# Patient Record
Sex: MALE
[De-identification: names, ages, dates, MRNs, and addresses within clinical notes are randomized per-mention and may not be internally consistent; named-entity substitution may affect disease eponyms.]

---

## 2023-08-02 DIAGNOSIS — Z00.00 HEALTH CARE MAINTENANCE: Primary | ICD-10-CM

## 2023-08-02 RX ORDER — SILDENAFIL 50 MG/1
100 TABLET, FILM COATED ORAL AS NEEDED
COMMUNITY
Start: 2020-06-09 | End: 2023-08-02 | Stop reason: SDUPTHER

## 2023-08-02 RX ORDER — MINOXIDIL 2.5 MG/1
2.5 TABLET ORAL DAILY
Qty: 90 TABLET | Refills: 3 | Status: CANCELLED | OUTPATIENT
Start: 2023-08-02

## 2023-08-02 RX ORDER — MINOXIDIL 2.5 MG/1
2.5 TABLET ORAL DAILY
COMMUNITY
Start: 2023-07-10 | End: 2023-08-03 | Stop reason: DRUGHIGH

## 2023-08-03 RX ORDER — SILDENAFIL 50 MG/1
100 TABLET, FILM COATED ORAL AS NEEDED
Qty: 30 TABLET | Refills: 0 | Status: SHIPPED | OUTPATIENT
Start: 2023-08-03 | End: 2023-08-07 | Stop reason: SDUPTHER

## 2023-08-03 RX ORDER — MINOXIDIL 2.5 MG/1
2.5 TABLET ORAL DAILY
Qty: 30 TABLET | Refills: 0 | Status: SHIPPED | OUTPATIENT
Start: 2023-08-03

## 2023-08-07 DIAGNOSIS — Z00.00 HEALTH CARE MAINTENANCE: ICD-10-CM

## 2023-08-10 RX ORDER — SILDENAFIL 50 MG/1
100 TABLET, FILM COATED ORAL AS NEEDED
Qty: 30 TABLET | Refills: 0 | Status: SHIPPED | OUTPATIENT
Start: 2023-08-10 | End: 2024-02-26 | Stop reason: SDUPTHER

## 2023-09-27 ENCOUNTER — NURSE TRIAGE (OUTPATIENT)
Dept: OTHER | Facility: CLINIC | Age: 58
End: 2023-09-27

## 2023-09-27 ENCOUNTER — OFFICE VISIT (OUTPATIENT)
Dept: PRIMARY CARE | Facility: CLINIC | Age: 58
End: 2023-09-27
Payer: COMMERCIAL

## 2023-09-27 ENCOUNTER — APPOINTMENT (OUTPATIENT)
Dept: PRIMARY CARE | Facility: CLINIC | Age: 58
End: 2023-09-27
Payer: COMMERCIAL

## 2023-09-27 VITALS — RESPIRATION RATE: 12 BRPM

## 2023-09-27 DIAGNOSIS — F41.8 SITUATIONAL ANXIETY: ICD-10-CM

## 2023-09-27 DIAGNOSIS — Z70.8 ENCOUNTER FOR SEXUAL HEALTH EDUCATION: ICD-10-CM

## 2023-09-27 DIAGNOSIS — Z00.00 HEALTH CARE MAINTENANCE: Primary | ICD-10-CM

## 2023-09-27 PROCEDURE — 99213 OFFICE O/P EST LOW 20 MIN: CPT | Performed by: INTERNAL MEDICINE

## 2023-09-27 NOTE — PROGRESS NOTES
Subjective   Patient ID: Raciel Sanchez is a 57 y.o. male who presents for No chief complaint on file..    HPI sick visit same day after hours no staff no chest pain no shortness of breath no skin lesions no irritations no urethritis no diarrhea no exposures but would feel better having STI testing    Review of Systems    Objective   There were no vitals taken for this visit.    Physical Exam vital signs noted alert and oriented x3 breathing unlabored not otherwise examined    Assessment/Plan    impression General health anxiety and STI counseling  Plan he would like to have HSV Ig G testing HIV testing RPR testing discussed with risk-benefit side effects of testing there are no symptoms no visible lesions okay to check for GC and chlamydia and follow-up on any results discussed with sexual health and protection follow-up for regular visit

## 2023-09-28 ENCOUNTER — TELEMEDICINE (OUTPATIENT)
Dept: PRIMARY CARE | Facility: CLINIC | Age: 58
End: 2023-09-28
Payer: COMMERCIAL

## 2023-09-28 DIAGNOSIS — U07.1 COVID-19 VIRUS INFECTION: Primary | ICD-10-CM

## 2023-09-28 DIAGNOSIS — R53.83 OTHER FATIGUE: ICD-10-CM

## 2023-09-28 DIAGNOSIS — J01.90 ACUTE SINUSITIS, RECURRENCE NOT SPECIFIED, UNSPECIFIED LOCATION: ICD-10-CM

## 2023-09-28 PROCEDURE — 99442 PR PHYS/QHP TELEPHONE EVALUATION 11-20 MIN: CPT | Performed by: INTERNAL MEDICINE

## 2023-09-28 NOTE — TELEPHONE ENCOUNTER
Location of patient: [place]    Subjective: Caller states \"I have a positive COVID test a couple hours ago and hoping to get Paxlovid \"     Current Symptoms:   Sore throat   Chills   Fever  Nonproductive cough     Onset: Monday Night, 2 days ago     Pain Severity: denies     Temperature: 97.5 oral     What has been tried: ibuprofren, tylenol, [facility] cough medicine, lucretia v     Recommended disposition: See PCP within 24 Hours    Care advice provided, patient verbalizes understanding; denies any other questions or concerns; instructed to call back for any new or worsening symptoms. Patient/caller agrees to follow-up with PCP     This triage is a result of a call to [address] [place] [address]. Please do not respond to the triage nurse through this encounter. Any subsequent communication should be directly with the patient.     Reason for Disposition   [1] HIGH RISK patient AND [2] influenza is widespread in the community AND [3] ONE OR MORE respiratory symptoms: cough, sore throat, runny or stuffy nose    Protocols used: Coronavirus (COVID-19) Diagnosed or Sus[facility]

## 2023-09-28 NOTE — PROGRESS NOTES
Subjective   Patient ID: Raciel Sanchez is a 57 y.o. male who presents for No chief complaint on file..    HPI patient initiated telehealth visit this visit was completed via telephone secondary to the restrictions of the COVID-19 pandemic all medical issues were addressed and discussed with patient patient was not otherwise examined if it was felt that the patient needed to be seen in the office they would have been referred to do so patient verbally consented to visit  Sick visit he had flown in from [place] 5 days ago yesterday he began having symptoms sinus sore throat fatigue low-grade fever had COVID testing late last night and was positive had tried some over-the-counter medications without success    Review of Systems    Objective   There were no vitals taken for this visit.    Physical Exam alert and oriented x3 breathing unlabored not otherwise examined    Assessment/Plan impression COVID-positive sinus fatigue  Plan he will hold on having any additional blood work at this time 5 days of isolation 5 days of masking thereafter okay for Paxlovid therapy 3 tablets in the morning 3 tablets in the evening x5 days risk-benefit side effects reviewed with him and wishes to proceed see EMR good diet regular exercise increase water consumption Mucinex if needed to a lozenge if needed Zyrtec if needed proper rest recheck if no better and for regular visit

## 2023-10-02 ENCOUNTER — LAB (OUTPATIENT)
Dept: LAB | Facility: LAB | Age: 58
End: 2023-10-02
Payer: COMMERCIAL

## 2023-10-02 DIAGNOSIS — Z00.00 HEALTH CARE MAINTENANCE: ICD-10-CM

## 2023-10-02 LAB — HIV 1+2 AB+HIV1 P24 AG SERPL QL IA: NONREACTIVE

## 2023-10-02 PROCEDURE — 86694 HERPES SIMPLEX NES ANTBDY: CPT

## 2023-10-02 PROCEDURE — 36415 COLL VENOUS BLD VENIPUNCTURE: CPT

## 2023-10-03 PROBLEM — G89.29 CHRONIC BACK PAIN: Status: ACTIVE | Noted: 2023-10-03

## 2023-10-03 PROBLEM — G44.209 TENSION HEADACHE: Status: ACTIVE | Noted: 2023-10-03

## 2023-10-03 PROBLEM — M54.12 RIGHT CERVICAL RADICULOPATHY: Status: ACTIVE | Noted: 2023-10-03

## 2023-10-03 PROBLEM — M54.12 LEFT CERVICAL RADICULOPATHY: Status: ACTIVE | Noted: 2023-10-03

## 2023-10-03 PROBLEM — F10.10 ALCOHOL ABUSE: Status: ACTIVE | Noted: 2023-10-03

## 2023-10-03 PROBLEM — M17.11 PRIMARY LOCALIZED OSTEOARTHROSIS OF RIGHT LOWER LEG: Status: ACTIVE | Noted: 2023-10-03

## 2023-10-03 PROBLEM — M54.2 CERVICALGIA: Status: ACTIVE | Noted: 2023-10-03

## 2023-10-03 PROBLEM — M54.12 CERVICAL RADICULOPATHY: Status: ACTIVE | Noted: 2023-10-03

## 2023-10-03 PROBLEM — N52.9 MALE ERECTILE DISORDER: Status: ACTIVE | Noted: 2023-10-03

## 2023-10-03 PROBLEM — M54.9 CHRONIC BACK PAIN: Status: ACTIVE | Noted: 2023-10-03

## 2023-10-03 PROBLEM — M11.269 CHONDROCALCINOSIS DUE TO DICALCIUM PHOSPHATE CRYSTALS, OF THE KNEE: Status: ACTIVE | Noted: 2023-10-03

## 2023-10-03 PROBLEM — M87.00 AVN (AVASCULAR NECROSIS OF BONE) (MULTI): Status: ACTIVE | Noted: 2023-10-03

## 2023-10-03 PROBLEM — F32.A DEPRESSION: Status: ACTIVE | Noted: 2023-10-03

## 2023-10-03 PROBLEM — F10.11 HISTORY OF ALCOHOL ABUSE: Status: ACTIVE | Noted: 2023-10-03

## 2023-10-03 PROBLEM — M17.12 PRIMARY LOCALIZED OSTEOARTHROSIS OF LEFT LOWER LEG: Status: ACTIVE | Noted: 2023-10-03

## 2023-10-03 PROBLEM — R73.02 GLUCOSE INTOLERANCE (IMPAIRED GLUCOSE TOLERANCE): Status: ACTIVE | Noted: 2023-10-03

## 2023-10-03 PROBLEM — F41.9 ANXIETY: Status: ACTIVE | Noted: 2023-10-03

## 2023-10-03 PROBLEM — B00.9 HSV INFECTION: Status: ACTIVE | Noted: 2023-10-03

## 2023-10-03 PROBLEM — H69.90 EUSTACHIAN TUBE DYSFUNCTION: Status: ACTIVE | Noted: 2023-10-03

## 2023-10-03 PROBLEM — G95.9 CERVICAL MYELOPATHY (MULTI): Status: ACTIVE | Noted: 2023-10-03

## 2023-10-03 PROBLEM — M79.10 MYALGIA: Status: ACTIVE | Noted: 2023-10-03

## 2023-10-03 LAB
C TRACH RRNA SPEC QL NAA+PROBE: NEGATIVE
N GONORRHOEA DNA SPEC QL PROBE+SIG AMP: NEGATIVE
RPR SER QL: NONREACTIVE

## 2023-10-03 RX ORDER — DULOXETIN HYDROCHLORIDE 60 MG/1
60 CAPSULE, DELAYED RELEASE ORAL DAILY
COMMUNITY

## 2023-10-03 RX ORDER — ASPIRIN 81 MG
1 TABLET, DELAYED RELEASE (ENTERIC COATED) ORAL DAILY
COMMUNITY
Start: 2022-10-03

## 2023-10-03 RX ORDER — SODIUM, POTASSIUM,MAG SULFATES 17.5-3.13G
SOLUTION, RECONSTITUTED, ORAL ORAL
COMMUNITY
Start: 2020-10-19

## 2023-10-03 RX ORDER — TESTOSTERONE 30 MG/1.5ML
SOLUTION TOPICAL
COMMUNITY

## 2023-10-03 RX ORDER — OXYCODONE HYDROCHLORIDE 5 MG/1
5 TABLET ORAL EVERY 6 HOURS PRN
COMMUNITY
Start: 2022-09-15

## 2023-10-03 RX ORDER — VENLAFAXINE HYDROCHLORIDE 150 MG/1
150 CAPSULE, EXTENDED RELEASE ORAL DAILY
COMMUNITY

## 2023-10-03 RX ORDER — PREDNISONE 20 MG/1
2 TABLET ORAL
COMMUNITY
Start: 2019-07-22

## 2023-10-03 RX ORDER — MELOXICAM 15 MG/1
1 TABLET ORAL
COMMUNITY
Start: 2021-04-12

## 2023-10-03 RX ORDER — DIAZEPAM 5 MG/1
TABLET ORAL
COMMUNITY
Start: 2023-04-17

## 2023-10-03 RX ORDER — TACROLIMUS 0.3 MG/G
OINTMENT TOPICAL 2 TIMES DAILY
COMMUNITY
Start: 2015-07-08

## 2023-10-03 RX ORDER — TESTOSTERONE CYPIONATE 200 MG/ML
0.8 INJECTION, SOLUTION INTRAMUSCULAR
COMMUNITY
Start: 2023-08-21

## 2023-10-03 RX ORDER — CLOBETASOL PROPIONATE 0.5 MG/G
1 CREAM TOPICAL
COMMUNITY
Start: 2015-07-08

## 2023-10-03 RX ORDER — OXYCODONE AND ACETAMINOPHEN 5; 325 MG/1; MG/1
TABLET ORAL
COMMUNITY
Start: 2023-04-17

## 2023-10-03 RX ORDER — CLOMIPHENE CITRATE 50 MG/1
50 TABLET ORAL 2 TIMES WEEKLY
COMMUNITY

## 2023-10-03 RX ORDER — HYDROXYUREA 500 MG/1
500-1000 CAPSULE ORAL
COMMUNITY
Start: 2019-07-22

## 2023-10-03 RX ORDER — ADALIMUMAB 40MG/0.4ML
KIT SUBCUTANEOUS
COMMUNITY
Start: 2023-08-07

## 2023-10-03 RX ORDER — DUTASTERIDE 0.5 MG/1
0.5 CAPSULE, LIQUID FILLED ORAL DAILY
COMMUNITY

## 2023-10-03 RX ORDER — TOPIRAMATE 100 MG/1
100 TABLET, FILM COATED ORAL 2 TIMES DAILY
COMMUNITY

## 2023-10-03 RX ORDER — METHYLPREDNISOLONE 4 MG/1
TABLET ORAL
COMMUNITY
Start: 2023-06-09

## 2023-10-04 ENCOUNTER — ANCILLARY PROCEDURE (OUTPATIENT)
Dept: ENDOCRINOLOGY | Facility: CLINIC | Age: 58
End: 2023-10-04
Payer: COMMERCIAL

## 2023-10-04 DIAGNOSIS — Z30.09 VASECTOMY EVALUATION: ICD-10-CM

## 2023-10-04 LAB
ABSTINENCE (DAYS): 2 DAYS (ref 2–7)
AGGLUTINATION (SEMEN): NO
AMOUNT MISSED:: ABNORMAL
ANALYZED TIME:: ABNORMAL
ANDROLOGY LAB ID#: ABNORMAL
CLUMPS (SEMEN): NO
COLLECTED COMPLETELY: YES
COLLECTION LOCATION:: ABNORMAL
COLLECTION METHOD:: ABNORMAL
CONCENTRATION CN (POST-WASH): 0 MILL/ML
CONCENTRATION(SEMEN): 0 MILL/ML (ref 15–?)
DEBRIS (SEMEN): YES
DEGREE (SEMEN): ABNORMAL
HSV1+2 IGM SER IA-ACNC: 0.4 IV
LEUKOCYTE (SEMEN): ABNORMAL
NON PROG. MOTILITY (SEMEN): 0 %
NON PROG. MOTILITY CN (POST-WASH): 0 %
PATTERN (SEMEN): ABNORMAL
PORTION MISSED REI: ABNORMAL
PROG. MOTILITY (SEMEN): 0 % (ref 32–?)
PROG. MOTILITY CN (POST-WASH): 0 %
RECEIVED TIME:: ABNORMAL
SEMEN APPEARANCE: NORMAL
SEMEN LIQUEFACTION: NORMAL
SEMEN VISCOSITY: ABNORMAL
TOTAL MOTILITY (SEMEN): 0 % (ref 40–?)
TOTAL MOTILITY CN (POST-WASH): 0 %
TOTAL NO OF MOTILE (SEMEN): 0 MILL
TOTAL NO OF MOTILE CN (POST-WASH): 0 MILL
TOTAL NO OF RND CELLS (SEMEN): ABNORMAL MILL (ref ?–5)
TOTAL NO OF SPERM (SEMEN): 0 MILL (ref 39–?)
TOTAL NO OF SPERM CN (POST-WASH): 0 MILL
VOLUME (SEMEN): 0.3 ML (ref 1.5–?)
VOLUME CN (POST-WASH): 0.2 ML

## 2023-10-04 PROCEDURE — 89321 SEMEN ANAL SPERM DETECTION: CPT | Performed by: OBSTETRICS & GYNECOLOGY

## 2023-10-05 ENCOUNTER — DOCUMENTATION (OUTPATIENT)
Dept: UROLOGY | Facility: CLINIC | Age: 58
End: 2023-10-05
Payer: COMMERCIAL

## 2023-10-05 NOTE — PROGRESS NOTES
Per Dr Villalobos,   PVSA shows no sperm and pt  is safe to stop using contraception   Pt notified by VM

## 2023-10-11 ENCOUNTER — APPOINTMENT (OUTPATIENT)
Dept: ENDOCRINOLOGY | Facility: CLINIC | Age: 58
End: 2023-10-11
Payer: COMMERCIAL

## 2023-12-17 LAB — T PALLIDUM AB SER QL AGGL: NORMAL

## 2024-01-31 ENCOUNTER — OFFICE VISIT (OUTPATIENT)
Dept: UROLOGY | Facility: CLINIC | Age: 59
End: 2024-01-31
Payer: COMMERCIAL

## 2024-01-31 VITALS
SYSTOLIC BLOOD PRESSURE: 118 MMHG | BODY MASS INDEX: 26.95 KG/M2 | TEMPERATURE: 97.2 F | HEIGHT: 72 IN | WEIGHT: 199 LBS | DIASTOLIC BLOOD PRESSURE: 74 MMHG | HEART RATE: 77 BPM

## 2024-01-31 DIAGNOSIS — N52.9 ERECTILE DYSFUNCTION, UNSPECIFIED ERECTILE DYSFUNCTION TYPE: Primary | ICD-10-CM

## 2024-01-31 PROCEDURE — 1036F TOBACCO NON-USER: CPT | Performed by: NURSE PRACTITIONER

## 2024-01-31 PROCEDURE — 99214 OFFICE O/P EST MOD 30 MIN: CPT | Performed by: NURSE PRACTITIONER

## 2024-01-31 RX ORDER — TADALAFIL 5 MG/1
5 TABLET ORAL DAILY
Qty: 30 TABLET | Refills: 2 | Status: SHIPPED | OUTPATIENT
Start: 2024-01-31 | End: 2024-05-09

## 2024-01-31 NOTE — PROGRESS NOTES
UROLOGIC FOLLOW-UP VISIT     PROBLEM LIST:  1. Erectile dysfunction, unspecified erectile dysfunction type  DISCONTINUED: tadalafil (Cialis) 5 mg tablet      2. Benign prostatic hyperplasia with weak urinary stream             HISTORY OF PRESENT ILLNESS:   Raciel Sanchez is a 58 y.o. with erectile dysfunction, hypogonadism   Vasectomy with Dr. Villalobos 6/30/23    INTERVAL HISTORY:  Returns today for [facility]  Seen unaccompanied  Reports sildenafil 25 mg doesn't always work, dislikes lack of spontaneity  Also with urinary symptoms worsening over past two years  On testosterone replacement through private men[facility] clinic  No dysuria or hematuria    Daughter is on the spectrum, non-verbal  Currently in the process of changing psychiatrists    PAST MEDICAL HISTORY:  Past Medical History:   Diagnosis Date    Personal history of other diseases of the musculoskeletal system and connective tissue     History of arthritis       PAST SURGICAL HISTORY:  Past Surgical History:   Procedure Laterality Date    OTHER SURGICAL HISTORY  09/01/2020    Knee arthroscopy        ALLERGIES:   Allergies   Allergen Reactions    Opioids - Morphine Analogues Unknown     former dependence        MEDICATIONS:   Current Outpatient Medications on File Prior to Visit   Medication Sig Dispense Refill    ANASTROZOLE ORAL Take 0.25 mg by mouth 1 (one) time per week.      clomiPHENE (Clomid) 50 mg tablet Take 1 tablet (50 mg) by mouth 2 times a week.      minoxidil (Loniten) 2.5 mg tablet Take 1 tablet (2.5 mg) by mouth once daily. 30 tablet 0    sildenafil (Viagra) 50 mg tablet Take 2 tablets (100 mg) by mouth if needed for erectile dysfunction. 30 tablet 0    testosterone cypionate (Depo-Testosterone) 200 mg/mL injection Inject 0.8 mL (160 mg) into the muscle every 7 days.      clobetasol (Temovate) 0.05 % cream Apply 1 Application topically.      diazePAM (Valium) 5 mg tablet       docusate sodium (Colace) 100 mg tablet Take 1 tablet (100 mg) by mouth  once daily.      DULoxetine (Cymbalta) 60 mg DR capsule Take 1 capsule (60 mg) by mouth once daily.      dutasteride (Avodart) 0.5 mg capsule Take 1 capsule (0.5 mg) by mouth once daily.      Humira,CF, Pen 40 mg/0.4 mL pen injector kit pen-injector       hydroxyurea (Hydrea) 500 mg capsule Take 1-2 capsules (500-1,000 mg total) by mouth.      meloxicam (Mobic) 15 mg tablet Take 1 tablet (15 mg) by mouth once daily with a meal.      methylPREDNISolone (Medrol Dospak) 4 mg tablets 1 pack as directed      NON FORMULARY enclomiphene citrate 12.5 mg; Take ONE tablet by mouth four times per week      oxyCODONE (Roxicodone) 5 mg immediate release tablet Take 1 tablet (5 mg) by mouth every 6 hours if needed (breakthrough pain).      oxyCODONE-acetaminophen (Percocet) 5-325 mg tablet       predniSONE (Deltasone) 20 mg tablet Take 2 tablets (40 mg) by mouth.      sodium,potassium,mag sulfates (Suprep Bowel Prep Kit) 17.5-3.13-1.6 gram recon soln solution DILUTE CONTENTS AND USE AS DIRECTED FOR BOWEL PREP      tacrolimus (Protopic) 0.03 % ointment Apply topically 2 times a day. Apply thin layer to affected area.      testosterone (Axiron) 30 mg/actuation (1.5 mL) topical solution 1 (one) time per week.  inject 0.8      topiramate (Topamax) 100 mg tablet Take 1 tablet (100 mg) by mouth 2 times a day.      venlafaxine XR (Effexor-XR) 150 mg 24 hr capsule Take 1 capsule (150 mg) by mouth once daily.       No current facility-administered medications on file prior to visit.        SOCIAL HISTORY:  Patient  reports that he has never smoked. He has never used smokeless tobacco. He reports that he does not drink alcohol and does not use drugs.   Social History     Socioeconomic History    Marital status: Single     Spouse name: Not on file    Number of children: Not on file    Years of education: Not on file    Highest education level: Not on file   Occupational History    Not on file   Tobacco Use    Smoking status: Never     "Smokeless tobacco: Never   Substance and Sexual Activity    Alcohol use: Never    Drug use: Never    Sexual activity: Not on file   Other Topics Concern    Not on file   Social History Narrative    Not on file     Social Determinants of Health     Financial Resource Strain: Not on file   Food Insecurity: Not on file   Transportation Needs: Not on file   Physical Activity: Not on file   Stress: Not on file   Social Connections: Not on file   Intimate Partner Violence: Not on file   Housing Stability: Not on file       FAMILY HISTORY:  Family History   Family history unknown: Yes       REVIEW OF SYSTEMS:  All systems reviewed, pertinent negatives as noted in HPI.     PHYSICAL EXAM:  Visit Vitals  /74   Pulse 77   Temp 36.2 °C (97.2 °F)     Constitutional: Well-developed and well-nourished. No distress.    Head: Normocephalic and atraumatic.    Neck: Normal range of motion.     Pulmonary/Chest: Effort normal. No respiratory distress.   Abdominal: Non-distended.  : Deferred.  Integumentary: No rash or lesions visualized.  Musculoskeletal: Normal range of motion.    Neurological: Alert and oriented.  Psychiatric: Normal mood and affect. Thought content normal.      LABORATORY REVIEW:   Lab Results   Component Value Date    BUN 14 01/19/2023    CREATININE 0.99 01/19/2023     01/19/2023    K 3.9 01/19/2023    CL 99 01/19/2023    CO2 31 01/19/2023    CALCIUM 9.5 01/19/2023      Lab Results   Component Value Date    WBC 8.3 01/19/2023    RBC 4.88 01/19/2023    HGB 14.7 01/19/2023    HCT 45.9 01/19/2023    MCV 94 01/19/2023    MCHC 32.0 01/19/2023    RDW 13.7 01/19/2023     01/19/2023        No results found for: \"PSA\"        Assessment:      1. Erectile dysfunction, unspecified erectile dysfunction type  DISCONTINUED: tadalafil (Cialis) 5 mg tablet      2. Benign prostatic hyperplasia with weak urinary stream            Raciel NADYA Garcíaer is a 58 y.o. with erectile dysfunction, hypogonadism on testosterone " through private clinic  Vasectomy with Dr. Villalobos 6/30/23  Modest benefit with low-dose sildenafil  Worsening symptoms of BPH over past ~2 years including frequency, weak stream  Moderate LUTS, IPSS 11     Plan:   Start tadalafil 5 mg po daily for BPH, ED  Continue prn sildenafil  RTC in 2-3 months for reassessment  Encouraged to contact us in the interim with any questions, concerns

## 2024-02-12 ENCOUNTER — PATIENT MESSAGE (OUTPATIENT)
Dept: UROLOGY | Facility: CLINIC | Age: 59
End: 2024-02-12

## 2024-02-26 DIAGNOSIS — Z00.00 HEALTH CARE MAINTENANCE: ICD-10-CM

## 2024-02-27 ENCOUNTER — TELEPHONE (OUTPATIENT)
Dept: PRIMARY CARE | Facility: CLINIC | Age: 59
End: 2024-02-27

## 2024-02-27 DIAGNOSIS — Z00.00 HEALTH CARE MAINTENANCE: Primary | ICD-10-CM

## 2024-02-27 RX ORDER — SILDENAFIL 50 MG/1
100 TABLET, FILM COATED ORAL AS NEEDED
Qty: 30 TABLET | Refills: 0 | Status: SHIPPED | OUTPATIENT
Start: 2024-02-27 | End: 2024-04-10 | Stop reason: SDUPTHER

## 2024-02-28 ENCOUNTER — LAB (OUTPATIENT)
Dept: LAB | Facility: LAB | Age: 59
End: 2024-02-28
Payer: COMMERCIAL

## 2024-02-28 DIAGNOSIS — Z00.00 HEALTH CARE MAINTENANCE: ICD-10-CM

## 2024-02-28 LAB
HIV 1+2 AB+HIV1 P24 AG SERPL QL IA: NONREACTIVE
TREPONEMA PALLIDUM IGG+IGM AB [PRESENCE] IN SERUM OR PLASMA BY IMMUNOASSAY: NONREACTIVE

## 2024-02-28 PROCEDURE — 86780 TREPONEMA PALLIDUM: CPT

## 2024-02-28 PROCEDURE — 86694 HERPES SIMPLEX NES ANTBDY: CPT

## 2024-02-28 PROCEDURE — 87800 DETECT AGNT MULT DNA DIREC: CPT

## 2024-02-28 PROCEDURE — 87389 HIV-1 AG W/HIV-1&-2 AB AG IA: CPT

## 2024-02-28 PROCEDURE — 36415 COLL VENOUS BLD VENIPUNCTURE: CPT

## 2024-02-29 LAB
C TRACH RRNA SPEC QL NAA+PROBE: NEGATIVE
N GONORRHOEA DNA SPEC QL PROBE+SIG AMP: NEGATIVE

## 2024-03-03 LAB — HSV1+2 IGM SER IA-ACNC: 0.37 IV

## 2024-03-04 DIAGNOSIS — Z00.00 HEALTH CARE MAINTENANCE: Primary | ICD-10-CM

## 2024-03-08 RX ORDER — VALACYCLOVIR HYDROCHLORIDE 1 G/1
TABLET, FILM COATED ORAL
Qty: 20 TABLET | Refills: 0 | Status: SHIPPED | OUTPATIENT
Start: 2024-03-08

## 2024-03-14 NOTE — PATIENT COMMUNICATION
Devin Walton,     Can you please check out the messages that have been sent? I sent patient a [employer] message to hold him over for the time being but he had left me a voicemail this morning

## 2024-03-19 NOTE — TELEPHONE ENCOUNTER
Sorry! See my message to the patient. We didn't prescribe any of these meds previously. If he would like us to take over managing his testosterone, he'll need another appointment since it's not something we talked about last time.

## 2024-03-20 ENCOUNTER — TELEPHONE (OUTPATIENT)
Dept: PRIMARY CARE | Facility: CLINIC | Age: 59
End: 2024-03-20

## 2024-03-20 DIAGNOSIS — N40.0 PROSTATISM: Primary | ICD-10-CM

## 2024-03-21 ENCOUNTER — OFFICE VISIT (OUTPATIENT)
Dept: UROLOGY | Facility: CLINIC | Age: 59
End: 2024-03-21
Payer: COMMERCIAL

## 2024-03-21 ENCOUNTER — LAB (OUTPATIENT)
Dept: LAB | Facility: LAB | Age: 59
End: 2024-03-21
Payer: COMMERCIAL

## 2024-03-21 VITALS — HEIGHT: 72 IN | TEMPERATURE: 96.8 F | BODY MASS INDEX: 26.99 KG/M2

## 2024-03-21 DIAGNOSIS — R39.9 UTI SYMPTOMS: ICD-10-CM

## 2024-03-21 DIAGNOSIS — R33.9 RETENTION OF URINE: Primary | ICD-10-CM

## 2024-03-21 DIAGNOSIS — N40.0 PROSTATISM: ICD-10-CM

## 2024-03-21 DIAGNOSIS — R39.12 WEAK URINARY STREAM: ICD-10-CM

## 2024-03-21 DIAGNOSIS — E29.1 HYPOGONADISM MALE: ICD-10-CM

## 2024-03-21 LAB
APPEARANCE UR: ABNORMAL
BACTERIA #/AREA URNS AUTO: ABNORMAL /HPF
BILIRUB UR STRIP.AUTO-MCNC: NEGATIVE MG/DL
COLOR UR: ABNORMAL
GLUCOSE UR STRIP.AUTO-MCNC: NORMAL MG/DL
KETONES UR STRIP.AUTO-MCNC: NEGATIVE MG/DL
LEUKOCYTE ESTERASE UR QL STRIP.AUTO: ABNORMAL
NITRITE UR QL STRIP.AUTO: ABNORMAL
PH UR STRIP.AUTO: 6 [PH]
POC APPEARANCE, URINE: CLEAR
POC BILIRUBIN, URINE: NEGATIVE
POC BLOOD, URINE: ABNORMAL
POC COLOR, URINE: YELLOW
POC GLUCOSE, URINE: NEGATIVE MG/DL
POC KETONES, URINE: NEGATIVE MG/DL
POC LEUKOCYTES, URINE: ABNORMAL
POC NITRITE,URINE: NEGATIVE
POC PH, URINE: 6.5 PH
POC PROTEIN, URINE: NEGATIVE MG/DL
POC SPECIFIC GRAVITY, URINE: 1.01
POC UROBILINOGEN, URINE: 0.2 EU/DL
PROT UR STRIP.AUTO-MCNC: NEGATIVE MG/DL
RBC # UR STRIP.AUTO: ABNORMAL /UL
RBC #/AREA URNS AUTO: >20 /HPF
SP GR UR STRIP.AUTO: 1.01
UROBILINOGEN UR STRIP.AUTO-MCNC: NORMAL MG/DL
WBC #/AREA URNS AUTO: >50 /HPF
WBC CLUMPS #/AREA URNS AUTO: ABNORMAL /HPF

## 2024-03-21 PROCEDURE — 1036F TOBACCO NON-USER: CPT | Performed by: NURSE PRACTITIONER

## 2024-03-21 PROCEDURE — 87086 URINE CULTURE/COLONY COUNT: CPT

## 2024-03-21 PROCEDURE — 87186 SC STD MICRODIL/AGAR DIL: CPT

## 2024-03-21 PROCEDURE — 81001 URINALYSIS AUTO W/SCOPE: CPT

## 2024-03-21 PROCEDURE — 99214 OFFICE O/P EST MOD 30 MIN: CPT | Performed by: NURSE PRACTITIONER

## 2024-03-21 PROCEDURE — 81003 URINALYSIS AUTO W/O SCOPE: CPT | Performed by: NURSE PRACTITIONER

## 2024-03-21 RX ORDER — SULFAMETHOXAZOLE AND TRIMETHOPRIM 800; 160 MG/1; MG/1
1 TABLET ORAL 2 TIMES DAILY
Qty: 14 TABLET | Refills: 0 | Status: SHIPPED | OUTPATIENT
Start: 2024-03-21 | End: 2024-03-28

## 2024-03-21 RX ORDER — TAMSULOSIN HYDROCHLORIDE 0.4 MG/1
0.4 CAPSULE ORAL DAILY
Qty: 30 CAPSULE | Refills: 0 | Status: SHIPPED | OUTPATIENT
Start: 2024-03-21

## 2024-03-21 ASSESSMENT — PAIN SCALES - GENERAL: PAINLEVEL: 0-NO PAIN

## 2024-03-21 NOTE — PROGRESS NOTES
UROLOGIC FOLLOW-UP VISIT     PROBLEM LIST:  1. Retention of urine  Measure post void residual    POCT UA Automated manually resulted           HISTORY OF PRESENT ILLNESS:   Raciel Sanchez is a 58 y.o. with erectile dysfunction, hypogonadism    INTERVAL HISTORY:  Returns today for [facility]UV  Seen unaccompanied  Notes weak stream, bladder pressure, sensation of blockage since Sunday  Submitted urine specimen to lab this am per PCP  No hematuria or dysuria  No personal or family hx of kidney or bladder stones  Has been out of testosterone for a couple of weeks  Thinks there may be a slight change in energy, stacristina  Decided to renew membership with men's clinic (x 5 months)  Planning on going out of town tomorrow    PAST MEDICAL HISTORY:  Past Medical History:   Diagnosis Date    Personal history of other diseases of the musculoskeletal system and connective tissue     History of arthritis       PAST SURGICAL HISTORY:  Past Surgical History:   Procedure Laterality Date    OTHER SURGICAL HISTORY  09/01/2020    Knee arthroscopy        ALLERGIES:   Allergies   Allergen Reactions    Opioids - Morphine Analogues Unknown     former dependence        MEDICATIONS:   Current Outpatient Medications on File Prior to Visit   Medication Sig Dispense Refill    ANASTROZOLE ORAL Take 0.25 mg by mouth 1 (one) time per week.      clomiPHENE (Clomid) 50 mg tablet Take 1 tablet (50 mg) by mouth 2 times a week.      sildenafil (Viagra) 50 mg tablet Take 2 tablets (100 mg) by mouth if needed for erectile dysfunction. 30 tablet 0    tadalafil (Cialis) 5 mg tablet Take 1 tablet (5 mg) by mouth once daily. 30 tablet 2    testosterone cypionate (Depo-Testosterone) 200 mg/mL injection Inject 0.8 mL (160 mg) into the muscle every 7 days.      valACYclovir (Valtrex) 1 gram tablet TAKE ONE TABLET BY MOUTH EVERY 12 HOURS. 20 tablet 0    clobetasol (Temovate) 0.05 % cream Apply 1 Application topically.      diazePAM (Valium) 5 mg tablet       docusate  sodium (Colace) 100 mg tablet Take 1 tablet (100 mg) by mouth once daily.      DULoxetine (Cymbalta) 60 mg DR capsule Take 1 capsule (60 mg) by mouth once daily.      dutasteride (Avodart) 0.5 mg capsule Take 1 capsule (0.5 mg) by mouth once daily.      Humira,CF, Pen 40 mg/0.4 mL pen injector kit pen-injector       hydroxyurea (Hydrea) 500 mg capsule Take 1-2 capsules (500-1,000 mg total) by mouth.      meloxicam (Mobic) 15 mg tablet Take 1 tablet (15 mg) by mouth once daily with a meal.      methylPREDNISolone (Medrol Dospak) 4 mg tablets 1 pack as directed      minoxidil (Loniten) 2.5 mg tablet Take 1 tablet (2.5 mg) by mouth once daily. 30 tablet 0    NON FORMULARY enclomiphene citrate 12.5 mg; Take ONE tablet by mouth four times per week      oxyCODONE (Roxicodone) 5 mg immediate release tablet Take 1 tablet (5 mg) by mouth every 6 hours if needed (breakthrough pain).      oxyCODONE-acetaminophen (Percocet) 5-325 mg tablet       predniSONE (Deltasone) 20 mg tablet Take 2 tablets (40 mg) by mouth.      sodium,potassium,mag sulfates (Suprep Bowel Prep Kit) 17.5-3.13-1.6 gram recon soln solution DILUTE CONTENTS AND USE AS DIRECTED FOR BOWEL PREP      tacrolimus (Protopic) 0.03 % ointment Apply topically 2 times a day. Apply thin layer to affected area.      testosterone (Axiron) 30 mg/actuation (1.5 mL) topical solution 1 (one) time per week.  inject 0.8      topiramate (Topamax) 100 mg tablet Take 1 tablet (100 mg) by mouth 2 times a day.      venlafaxine XR (Effexor-XR) 150 mg 24 hr capsule Take 1 capsule (150 mg) by mouth once daily.       No current facility-administered medications on file prior to visit.        SOCIAL HISTORY:  Patient  reports that he has never smoked. He has never used smokeless tobacco. He reports that he does not drink alcohol and does not use drugs.   Social History     Socioeconomic History    Marital status: Single     Spouse name: Not on file    Number of children: Not on file     Years of education: Not on file    Highest education level: Not on file   Occupational History    Not on file   Tobacco Use    Smoking status: Never    Smokeless tobacco: Never   Substance and Sexual Activity    Alcohol use: Never    Drug use: Never    Sexual activity: Not on file   Other Topics Concern    Not on file   Social History Narrative    Not on file     Social Determinants of Health     Financial Resource Strain: Not on file   Food Insecurity: Not on file   Transportation Needs: Not on file   Physical Activity: Not on file   Stress: Not on file   Social Connections: Not on file   Intimate Partner Violence: Not on file   Housing Stability: Not on file       FAMILY HISTORY:  Family History   Family history unknown: Yes       REVIEW OF SYSTEMS:  All systems reviewed, pertinent negatives as noted in HPI.     PHYSICAL EXAM:  Visit Vitals  Temp 36 °C (96.8 °F)     Constitutional: Well-developed and well-nourished. No distress.    Head: Normocephalic and atraumatic.    Neck: Normal range of motion.     Pulmonary/Chest: Effort normal. No respiratory distress.   Abdominal: Non-distended.  : See below.  Integumentary: No rash or lesions visualized.  Musculoskeletal: Normal range of motion.    Neurological: Alert and oriented.  Psychiatric: Normal mood and affect. Thought content normal.      LABORATORY REVIEW:   Lab Results   Component Value Date    BUN 14 01/19/2023    CREATININE 0.99 01/19/2023     01/19/2023    K 3.9 01/19/2023    CL 99 01/19/2023    CO2 31 01/19/2023    CALCIUM 9.5 01/19/2023      Lab Results   Component Value Date    WBC 8.3 01/19/2023    RBC 4.88 01/19/2023    HGB 14.7 01/19/2023    HCT 45.9 01/19/2023    MCV 94 01/19/2023    MCHC 32.0 01/19/2023    RDW 13.7 01/19/2023     01/19/2023        Urine dipstick shows positive for RBC's and positive for leukocytes.        Assessment:      1. Retention of urine  Measure post void residual    POCT UA Automated manually resulted           Raciel Sanchez is a 58 y.o. with erectile dysfunction, hypogonadism  Recent onset of obstructive symptoms; PVR 63  UA not clearly indicative of UTI    Reviewed ddx UTI, prostatitis, BPH, stones  Discussed potential for testosterone replacement to increase BPH  Will be receiving testosterone through private men's clinic for the time being     Plan:   Start tamsulosin 0.4 mg po daily  Consider tx with empiric antibiotics pending preliminary urine culture  We will be happy to take over testosterone management once membership expires  RTC in 3-4 weeks for reassessment or sooner if needed  Encouraged to contact us in the interim with any questions, concerns

## 2024-03-23 LAB — BACTERIA UR CULT: ABNORMAL

## 2024-04-04 ENCOUNTER — LAB (OUTPATIENT)
Dept: LAB | Facility: LAB | Age: 59
End: 2024-04-04
Payer: COMMERCIAL

## 2024-04-04 DIAGNOSIS — E29.1 HYPOGONADISM MALE: ICD-10-CM

## 2024-04-04 LAB
ERYTHROCYTE [DISTWIDTH] IN BLOOD BY AUTOMATED COUNT: 13.1 % (ref 11.5–14.5)
HCT VFR BLD AUTO: 44.3 % (ref 41–52)
HGB BLD-MCNC: 14.7 G/DL (ref 13.5–17.5)
MCH RBC QN AUTO: 30.6 PG (ref 26–34)
MCHC RBC AUTO-ENTMCNC: 33.2 G/DL (ref 32–36)
MCV RBC AUTO: 92 FL (ref 80–100)
NRBC BLD-RTO: 0 /100 WBCS (ref 0–0)
PLATELET # BLD AUTO: 242 X10*3/UL (ref 150–450)
RBC # BLD AUTO: 4.81 X10*6/UL (ref 4.5–5.9)
TESTOST SERPL-MCNC: 512 NG/DL (ref 240–1000)
WBC # BLD AUTO: 7.1 X10*3/UL (ref 4.4–11.3)

## 2024-04-04 PROCEDURE — 85027 COMPLETE CBC AUTOMATED: CPT

## 2024-04-04 PROCEDURE — 36415 COLL VENOUS BLD VENIPUNCTURE: CPT

## 2024-04-04 PROCEDURE — 84403 ASSAY OF TOTAL TESTOSTERONE: CPT

## 2024-04-05 DIAGNOSIS — E29.1 HYPOGONADISM MALE: Primary | ICD-10-CM

## 2024-04-08 ENCOUNTER — LAB (OUTPATIENT)
Dept: LAB | Facility: LAB | Age: 59
End: 2024-04-08
Payer: COMMERCIAL

## 2024-04-08 DIAGNOSIS — E29.1 HYPOGONADISM MALE: ICD-10-CM

## 2024-04-08 LAB — TESTOST SERPL-MCNC: 549 NG/DL (ref 240–1000)

## 2024-04-08 PROCEDURE — 84403 ASSAY OF TOTAL TESTOSTERONE: CPT

## 2024-04-08 PROCEDURE — 36415 COLL VENOUS BLD VENIPUNCTURE: CPT

## 2024-04-10 ENCOUNTER — APPOINTMENT (OUTPATIENT)
Dept: UROLOGY | Facility: CLINIC | Age: 59
End: 2024-04-10
Payer: COMMERCIAL

## 2024-04-10 DIAGNOSIS — Z00.00 HEALTH CARE MAINTENANCE: ICD-10-CM

## 2024-04-10 RX ORDER — SILDENAFIL 50 MG/1
100 TABLET, FILM COATED ORAL AS NEEDED
Qty: 30 TABLET | Refills: 0 | Status: SHIPPED | OUTPATIENT
Start: 2024-04-10 | End: 2024-04-12 | Stop reason: SDUPTHER

## 2024-04-10 NOTE — PATIENT COMMUNICATION
Lindsay Walton,    Patient called regarding the prescriptions and his message from Monday. He is leaving for [place] in a few days and needs an answer as soon as possible or something ordered.     Please advise

## 2024-04-12 DIAGNOSIS — Z00.00 HEALTH CARE MAINTENANCE: ICD-10-CM

## 2024-04-13 RX ORDER — SILDENAFIL 50 MG/1
100 TABLET, FILM COATED ORAL AS NEEDED
Qty: 30 TABLET | Refills: 0 | Status: SHIPPED | OUTPATIENT
Start: 2024-04-13

## 2024-04-28 DIAGNOSIS — N52.9 ERECTILE DYSFUNCTION, UNSPECIFIED ERECTILE DYSFUNCTION TYPE: ICD-10-CM

## 2024-05-01 ENCOUNTER — OFFICE VISIT (OUTPATIENT)
Dept: ORTHOPEDIC SURGERY | Facility: CLINIC | Age: 59
End: 2024-05-01
Payer: COMMERCIAL

## 2024-05-01 ENCOUNTER — HOSPITAL ENCOUNTER (OUTPATIENT)
Dept: RADIOLOGY | Facility: CLINIC | Age: 59
Discharge: HOME | End: 2024-05-01
Payer: COMMERCIAL

## 2024-05-01 VITALS — HEIGHT: 72 IN | WEIGHT: 199 LBS | BODY MASS INDEX: 26.95 KG/M2

## 2024-05-01 DIAGNOSIS — S93.602A FOOT SPRAIN, LEFT, INITIAL ENCOUNTER: ICD-10-CM

## 2024-05-01 DIAGNOSIS — M79.671 RIGHT FOOT PAIN: Primary | ICD-10-CM

## 2024-05-01 DIAGNOSIS — M79.671 RIGHT FOOT PAIN: ICD-10-CM

## 2024-05-01 PROCEDURE — 99213 OFFICE O/P EST LOW 20 MIN: CPT | Performed by: PHYSICIAN ASSISTANT

## 2024-05-01 PROCEDURE — 73630 X-RAY EXAM OF FOOT: CPT | Mod: RT

## 2024-05-01 PROCEDURE — 1036F TOBACCO NON-USER: CPT | Performed by: PHYSICIAN ASSISTANT

## 2024-05-01 PROCEDURE — 73630 X-RAY EXAM OF FOOT: CPT | Mod: RIGHT SIDE | Performed by: STUDENT IN AN ORGANIZED HEALTH CARE EDUCATION/TRAINING PROGRAM

## 2024-05-01 RX ORDER — METHYLPREDNISOLONE 4 MG/1
4 TABLET ORAL ONCE
Qty: 21 TABLET | Refills: 0 | Status: SHIPPED | OUTPATIENT
Start: 2024-05-01 | End: 2024-05-01

## 2024-05-01 ASSESSMENT — PAIN - FUNCTIONAL ASSESSMENT: PAIN_FUNCTIONAL_ASSESSMENT: 0-10

## 2024-05-01 ASSESSMENT — PAIN SCALES - GENERAL: PAINLEVEL_OUTOF10: 5 - MODERATE PAIN

## 2024-05-01 ASSESSMENT — PAIN DESCRIPTION - DESCRIPTORS: DESCRIPTORS: ACHING

## 2024-05-01 NOTE — PROGRESS NOTES
History of Present Illness  Raciel Sanchez is a 58 y.o.s male presenting for complaints of R foot pain. About two months ago pt had a forceful planting motion on the R foot which causes immediate pain in his mid/lateral foot. Initial pain was severe and has overall improved but still present. Pain is a deep aching sensation that increases with WB and improves with rest. His pain is constant and minimal at rest, but can get uncomfortable. He is very active and has still been able to exercise but impact workouts make things worse. Has tried OTC medications with minimal improvement. There is no associated swelling, redness, or bruising. No abrasions or breaks in the skin. Denies numbness, tingling, f/c, n/v, CP, SOB, or any other complaints/concerns. No previous issues with the foot and no prior hx of injury.      Past Medical History:   Diagnosis Date   • Personal history of other diseases of the musculoskeletal system and connective tissue     History of arthritis       Medication Documentation Review Audit       Reviewed by Yakov Cohen LPN (Licensed Nurse) on 05/01/24 at 0948      Medication Order Taking? Sig Documenting Provider Last Dose Status   ANASTROZOLE ORAL [id number] No Take 0.25 mg by mouth 1 (one) time per week. Historical MD Waleska Taking Active   clobetasol (Temovate) 0.05 % cream [id number]  Apply 1 Application topically. Historical Provider, MD  Active   clomiPHENE (Clomid) 50 mg tablet [id number] No Take 1 tablet (50 mg) by mouth 2 times a week. Mary Anne Galeano MD Taking Active   diazePAM (Valium) 5 mg tablet [id number]   Historical Provider, MD  Active   docusate sodium (Colace) 100 mg tablet [id number]  Take 1 tablet (100 mg) by mouth once daily. Mary Anne Provider, MD  Active   DULoxetine (Cymbalta) 60 mg DR capsule [id number]  Take 1 capsule (60 mg) by mouth once daily. Historical Provider, MD  Active   dutasteride (Avodart) 0.5 mg capsule [id number]  Take 1 capsule (0.5 mg) by mouth  once daily. Mary Anne Provider, MD  Active   Humira,CF, Pen 40 mg/0.4 mL pen injector kit pen-injector [id number]   Mary Anne Galeano MD  Active   hydroxyurea (Hydrea) 500 mg capsule [id number]  Take 1-2 capsules (500-1,000 mg total) by mouth. Mary Anne Provider, MD  Active   meloxicam (Mobic) 15 mg tablet [id number]  Take 1 tablet (15 mg) by mouth once daily with a meal. Mary Anne Provider, MD  Active   methylPREDNISolone (Medrol Dospak) 4 mg tablets [id number]  1 pack as directed Historical Provider, MD  Active   minoxidil (Loniten) 2.5 mg tablet [id number] No Take 1 tablet (2.5 mg) by mouth once daily. Jairo Burton MD Taking Active   NON FORMULARY [id number]  enclomiphene citrate 12.5 mg; Take ONE tablet by mouth four times per week Historical Provider, MD  Active   oxyCODONE (Roxicodone) 5 mg immediate release tablet [id number]  Take 1 tablet (5 mg) by mouth every 6 hours if needed (breakthrough pain). Historical MD Waleska  Active   oxyCODONE-acetaminophen (Percocet) 5-325 mg tablet [id number]   Historical Provider, MD  Active   predniSONE (Deltasone) 20 mg tablet [id number]  Take 2 tablets (40 mg) by mouth. Historical Provider, MD  Active   sildenafil (Viagra) 50 mg tablet [id number]  Take 2 tablets (100 mg) by mouth if needed for erectile dysfunction. Jairo Burton MD  Active   sodium,potassium,mag sulfates (Suprep Bowel Prep Kit) 17.5-3.13-1.6 gram recon soln solution [id number]  DILUTE CONTENTS AND USE AS DIRECTED FOR BOWEL PREP Historical Provider, MD  Active   tacrolimus (Protopic) 0.03 % ointment [id number]  Apply topically 2 times a day. Apply thin layer to affected area. Mary Anne Galeano MD  Active   tadalafil (Cialis) 5 mg tablet [id number] No Take 1 tablet (5 mg) by mouth once daily. Anton Lino, APRN-CNP Taking  [id number] [phone]   tamsulosin (Flomax) 0.4 mg 24 hr capsule [id number]  Take 1 capsule (0.4 mg) by mouth once daily. Anton Lino, APRN-CNP  Active   testosterone  (Axiron) 30 mg/actuation (1.5 mL) topical solution [id number]  1 (one) time per week.  inject 0.8 Historical Provider, MD  Active   testosterone cypionate (Depo-Testosterone) 200 mg/mL injection [id number] No Inject 0.8 mL (160 mg) into the muscle every 7 days. Historical Provider, MD Taking Active   topiramate (Topamax) 100 mg tablet [id number]  Take 1 tablet (100 mg) by mouth 2 times a day. Historical Provider, MD  Active   valACYclovir (Valtrex) 1 gram tablet [id number] No TAKE ONE TABLET BY MOUTH EVERY 12 HOURS. Jairo Burton MD Taking Active   venlafaxine XR (Effexor-XR) 150 mg 24 hr capsule [id number]  Take 1 capsule (150 mg) by mouth once daily. Historical Provider, MD  Active                    Allergies   Allergen Reactions   • Opioids - Morphine Analogues Unknown     former dependence       Social History     Socioeconomic History   • Marital status: Single     Spouse name: Not on file   • Number of children: Not on file   • Years of education: Not on file   • Highest education level: Not on file   Occupational History   • Not on file   Tobacco Use   • Smoking status: Never   • Smokeless tobacco: Never   Substance and Sexual Activity   • Alcohol use: Never   • Drug use: Never   • Sexual activity: Not on file   Other Topics Concern   • Not on file   Social History Narrative   • Not on file     Social Determinants of Health     Financial Resource Strain: Not on file   Food Insecurity: Not on file   Transportation Needs: Not on file   Physical Activity: Not on file   Stress: Not on file   Social Connections: Not on file   Intimate Partner Violence: Not on file   Housing Stability: Not on file       Past Surgical History:   Procedure Laterality Date   • OTHER SURGICAL HISTORY  09/01/2020    Knee arthroscopy        Review of Systems   30 point ROS reviewed and negative other than as listed in the HPI.      Exam  Gen: The pt is A&Ox3, NAD, and appear state age and weight  Psychiatric: mood and affect are  appropriate   Eyes: sclera are white, EOM grossly intact  ENT: MMM  Neck: supple, thyroid is midline  Respiratory: respirations are nonlabored, chest rise symmetric  CV: rate is regular by palpation of distal pulses  Abdomen: nondistended   Integument: no obvious cutaneous lesions noted. No signs of lymphangitis. No signs of systemic edema.   MSK:  Foot/Ankle Musculoskeletal Exam  Gait    Gait is normal.    Inspection    Right      Erythema: none      Effusion: none        Edema: none        Ecchymosis: none        Deformity: none        Alignment: normal        Previous foot incision: no previous incision      Palpation    Right      Increased warmth: none        Masses: none        Crepitus: none        Tenderness: present        Tenderness comment: ttp over the cuboid    Range of Motion    Right      Right foot/ankle range of motion is normal.      Strength    Right      Right foot/ankle strength is normal.     Neurovascular    Right      Right foot/ankle neurovascular exam is normal.        Imaging:  I personally reviewed multiple views of the  R foot  were obtained in the office today demonstrate  no acute fx or dislocation     Assessment:  Foot sprain  right     Plan:  We discussed conservative treatment with Natural history and expected course discussed. Questions answered.  Rest, ice, compression, elevation (RICE) therapy.  Home exercise plan outlined.  OTC analgesics as needed.  Medrol dose pack for acute inflammation    Will try strict activity modification for about a week and then ease back into activities. If still with persistent issues after a month will likely need MRI, but we will touch base and see how he is feeling after directed conservative treatment.     Natural history reviewed. All of the pt questions/concerns addressed and they are in agreement with the plan.

## 2024-05-09 RX ORDER — TADALAFIL 5 MG/1
5 TABLET ORAL DAILY
Qty: 30 TABLET | Refills: 2 | Status: SHIPPED | OUTPATIENT
Start: 2024-05-09 | End: 2024-06-10

## 2024-05-30 DIAGNOSIS — N52.9 ERECTILE DYSFUNCTION, UNSPECIFIED ERECTILE DYSFUNCTION TYPE: ICD-10-CM

## 2024-06-10 ENCOUNTER — OFFICE VISIT (OUTPATIENT)
Dept: DERMATOLOGY | Facility: CLINIC | Age: 59
End: 2024-06-10
Payer: COMMERCIAL

## 2024-06-10 DIAGNOSIS — D22.9 ACRAL NEVUS: ICD-10-CM

## 2024-06-10 DIAGNOSIS — L57.8 DIFFUSE PHOTOAGING OF SKIN: ICD-10-CM

## 2024-06-10 DIAGNOSIS — D22.9 MULTIPLE BENIGN NEVI: ICD-10-CM

## 2024-06-10 DIAGNOSIS — D22.9 FIBROUS PAPULE OF SKIN: ICD-10-CM

## 2024-06-10 DIAGNOSIS — L85.3 XEROSIS CUTIS: ICD-10-CM

## 2024-06-10 DIAGNOSIS — L57.0 ACTINIC KERATOSIS: Primary | ICD-10-CM

## 2024-06-10 PROCEDURE — 17000 DESTRUCT PREMALG LESION: CPT

## 2024-06-10 PROCEDURE — 99204 OFFICE O/P NEW MOD 45 MIN: CPT

## 2024-06-10 RX ORDER — TADALAFIL 5 MG/1
5 TABLET ORAL DAILY
Qty: 30 TABLET | Refills: 2 | Status: SHIPPED | OUTPATIENT
Start: 2024-06-10

## 2024-06-10 NOTE — PROGRESS NOTES
Subjective   HPI: Raciel Sanchez is a 58 y.o. male new patient who presents in office for a full body skin examination.    No fhx of skin ca  Never wore sunscreen as a kid  No blistering sunburns   Wears sunscreen now - spf 15  Has used tanning bed in past      ROS: No other skin or systemic complaints other than what is documented elsewhere in the note.    ALLERGIES: Opioids - morphine analogues    SOCIAL:  reports that he has never smoked. He has never used smokeless tobacco. He reports that he does not drink alcohol and does not use drugs.      Objective   A chaperone was present during the entirety of the examination.    Well appearing patient in no apparent distress; mood and affect are within normal limits.    A full examination was performed including scalp, head, eyes, ears, nose, lips, mouth, tongue, neck, chest, axillae, abdomen, back, buttocks, bilateral upper extremities, bilateral lower extremities, hands, feet, fingers, toes, fingernails, and toenails. Cervical, supraclavicular, axillary and inguinal lymph nodes were palpated. All findings within normal limits unless otherwise noted below.    Numerous benign appearing symmetrical, regular boarders, evenly pigmented, nevi    Spotty depigmentation and hyperpigmentation, and spotty hyperkeratosis with solar lentigo's in light exposed areas     Dorsum of Nose  -Thin erythematous papules with gritty scale      Left Lateral Plantar Surface, Left Medial Plantar Surface, Left Middle Plantar Surface  Well demaracated symmetrical evenly pigmented macules    Right Abdomen (side) - Upper  0.8 cm flesh colored papule        Assessment/Plan   1. Actinic keratosis  Dorsum of Nose    Discussed precancerous nature of AK's with patient. I recommended cryotherapy for treatment today. I discussed possible side effects of cryotherapy with patient, including erythema, swelling, or blistering. Patient verbalizes understanding and opts for treatment today.    Discussed with  patient that if the areas that were treated with LN2 today do not resolve within 4 weeks to please call my office for evaluation.    A total of 1 AK's were tx with cryotherapy today.    Cryotherapy, skin lesion - Dorsum of Nose    2. Xerosis cutis    Dry skin is common, can be worsened by climate (dry climate makes worse), harsh soaps, and lack of moisturizing. It may worsen as we age. I recommend a gentle skin care regimen including washing with a mild soap without fragrance such as dove for sensitive skin, cetaphil or cerave. Pat dry after washing and then apply a thick moisturizer such as Cerave cream.    3. Multiple benign nevi    Discussed the need for annual body examination. The patient was advised to practice sun protection and sun avoidance, use daily sunscreen, and perform regular self skin exams.  Sun protection was discussed, including avoiding the mid-day sun, wearing a sunscreen with SPF at least 60, and stressing the need for reapplication of sunscreen and applying more than they think they need.     Discussed the ABCDEs of melanoma and recommended patient observe moles for any changes.  Patient verbalizes understanding.    4. Diffuse photoaging of skin    Discussed the need for annual body examination. The patient was advised to practice sun protection and sun avoidance, use daily sunscreen, and perform regular self skin exams.  Sun protection was discussed, including avoiding the mid-day sun, wearing a sunscreen with SPF at least 60 that is broad spectrum and water resistant, and stressing the need for reapplication of sunscreen and applying more than they think they need.     5. Acral nevus (3)  Left Lateral Plantar Surface; Left Middle Plantar Surface; Left Medial Plantar Surface    Observation    6. Fibrous papule of skin  Right Abdomen (side) - Upper    Observation.         FOLLOW UP: 1 year fbse - 4 weeks ak check    The patient was encouraged to contact me with any further questions or  concerns.  Akiko Briggs PA-C  6/10/2024

## 2024-06-12 ENCOUNTER — LAB (OUTPATIENT)
Dept: LAB | Facility: LAB | Age: 59
End: 2024-06-12
Payer: COMMERCIAL

## 2024-06-12 DIAGNOSIS — Z00.00 HEALTH CARE MAINTENANCE: ICD-10-CM

## 2024-06-12 DIAGNOSIS — Z00.00 HEALTH CARE MAINTENANCE: Primary | ICD-10-CM

## 2024-06-12 PROCEDURE — 87591 N.GONORRHOEAE DNA AMP PROB: CPT

## 2024-06-12 PROCEDURE — 36415 COLL VENOUS BLD VENIPUNCTURE: CPT

## 2024-06-12 PROCEDURE — 86780 TREPONEMA PALLIDUM: CPT

## 2024-06-12 PROCEDURE — 87491 CHLMYD TRACH DNA AMP PROBE: CPT

## 2024-06-12 PROCEDURE — 87389 HIV-1 AG W/HIV-1&-2 AB AG IA: CPT

## 2024-06-13 LAB
C TRACH RRNA SPEC QL NAA+PROBE: NEGATIVE
N GONORRHOEA DNA SPEC QL PROBE+SIG AMP: NEGATIVE

## 2024-07-02 ENCOUNTER — APPOINTMENT (OUTPATIENT)
Dept: DERMATOLOGY | Facility: CLINIC | Age: 59
End: 2024-07-02
Payer: COMMERCIAL

## 2024-07-22 ENCOUNTER — LAB (OUTPATIENT)
Dept: LAB | Facility: LAB | Age: 59
End: 2024-07-22
Payer: COMMERCIAL

## 2024-07-22 ENCOUNTER — OFFICE VISIT (OUTPATIENT)
Dept: PRIMARY CARE | Facility: CLINIC | Age: 59
End: 2024-07-22
Payer: COMMERCIAL

## 2024-07-22 ENCOUNTER — APPOINTMENT (OUTPATIENT)
Dept: DERMATOLOGY | Facility: CLINIC | Age: 59
End: 2024-07-22
Payer: COMMERCIAL

## 2024-07-22 VITALS — SYSTOLIC BLOOD PRESSURE: 132 MMHG | BODY MASS INDEX: 24.82 KG/M2 | WEIGHT: 183 LBS | DIASTOLIC BLOOD PRESSURE: 74 MMHG

## 2024-07-22 DIAGNOSIS — R53.83 OTHER FATIGUE: ICD-10-CM

## 2024-07-22 DIAGNOSIS — F41.8 DEPRESSION WITH ANXIETY: ICD-10-CM

## 2024-07-22 DIAGNOSIS — L70.0 ACNE VULGARIS: ICD-10-CM

## 2024-07-22 DIAGNOSIS — L81.4 LENTIGO: ICD-10-CM

## 2024-07-22 DIAGNOSIS — D22.9 FIBROUS PAPULE OF SKIN: Primary | ICD-10-CM

## 2024-07-22 DIAGNOSIS — R63.4 WEIGHT LOSS: ICD-10-CM

## 2024-07-22 DIAGNOSIS — R63.4 WEIGHT LOSS: Primary | ICD-10-CM

## 2024-07-22 DIAGNOSIS — L98.7 EXCESSIVE AND REDUNDANT SKIN AND SUBCUTANEOUS TISSUE: ICD-10-CM

## 2024-07-22 LAB
ANION GAP SERPL CALC-SCNC: 12 MMOL/L (ref 10–20)
BUN SERPL-MCNC: 9 MG/DL (ref 6–23)
CALCIUM SERPL-MCNC: 9.7 MG/DL (ref 8.6–10.6)
CHLORIDE SERPL-SCNC: 96 MMOL/L (ref 98–107)
CO2 SERPL-SCNC: 32 MMOL/L (ref 21–32)
CREAT SERPL-MCNC: 0.85 MG/DL (ref 0.5–1.3)
EGFRCR SERPLBLD CKD-EPI 2021: >90 ML/MIN/1.73M*2
GLUCOSE SERPL-MCNC: 117 MG/DL (ref 74–99)
POTASSIUM SERPL-SCNC: 4.5 MMOL/L (ref 3.5–5.3)
SODIUM SERPL-SCNC: 135 MMOL/L (ref 136–145)
TSH SERPL-ACNC: 1.03 MIU/L (ref 0.44–3.98)

## 2024-07-22 PROCEDURE — 84443 ASSAY THYROID STIM HORMONE: CPT

## 2024-07-22 PROCEDURE — 36415 COLL VENOUS BLD VENIPUNCTURE: CPT

## 2024-07-22 PROCEDURE — 99213 OFFICE O/P EST LOW 20 MIN: CPT | Performed by: INTERNAL MEDICINE

## 2024-07-22 PROCEDURE — 80048 BASIC METABOLIC PNL TOTAL CA: CPT

## 2024-07-22 RX ORDER — SERTRALINE HYDROCHLORIDE 25 MG/1
25 TABLET, FILM COATED ORAL DAILY
Qty: 30 TABLET | Refills: 1 | Status: SHIPPED
Start: 2024-07-22 | End: 2024-07-31

## 2024-07-22 RX ORDER — LIDOCAINE HYDROCHLORIDE AND EPINEPHRINE 10; 10 MG/ML; UG/ML
5 INJECTION, SOLUTION INFILTRATION; PERINEURAL ONCE
Status: COMPLETED | OUTPATIENT
Start: 2024-07-22 | End: 2024-07-22

## 2024-07-22 RX ADMIN — LIDOCAINE HYDROCHLORIDE AND EPINEPHRINE 5 ML: 10; 10 INJECTION, SOLUTION INFILTRATION; PERINEURAL at 16:12

## 2024-07-22 NOTE — PROGRESS NOTES
Subjective   Patient ID: Raciel Sanchez is a 58 y.o. male who presents for No chief complaint on file..    HPI follow-up visit he is not feeling well overall daughter with autism has been doing well  This has affected him he had been on testosterone the dose was reduced to 0.8 every 2 weeks after the PSA was elevated this is being followed by PSA was rechecked after UTI and had come back down the history of depression dating back 10 years [place] was evident no chest pain no shortness of breath has lost some weight without much exercise or diet not sleeping as well also has medical marijuana    Also has been drinking more also has been vaping and occasional use of the  cigarette  Review of Systems    Objective   /74   Wt 83 kg (183 lb)   BMI 24.82 kg/m²     Physical Exam vital signs noted alert and oriented x 3 NCAT anxious no JVD chest clear to auscultation CV regular rate and rhythm S1-S2 extremities no clubbing cyanosis or edema normal distal pulses    Assessment/Plan     Vital signs noted alert and oriented x 3 NCAT anxious no JVD or bruit chest clear to auscultation CV regular rate and rhythm S1-S2 no proptosis no lid lag no thyromegaly extremities no clubbing cyanosis or edema normal distal pulses DTR 2+     Impression depression with anxiety weight loss fatigueDRAGON ISSUE  Other diagnoses  Plan okay for behavioral access clinic requisition made check TSH advised on thyroid check Chem-7 advised on glucose potassium and kidney function with relation to loss of weight other blood work reviewed with him from prior follow-up with his clinic.  Gave testosterone diet regular exercise increase water consumption and sleep hygiene okay for prescription sertraline 25 mg 1 p.o. daily CMP MR then recheck based on above      LMP 4 review and follow-up

## 2024-07-22 NOTE — PROGRESS NOTES
Subjective   HPI: Rcaiel Sanchez is a 58 y.o. male who presents in office for face check, biopsy of abdomen, dark patches on the face,.    ROS: No other skin or systemic complaints other than what is documented elsewhere in the note.    ALLERGIES: Opioids - morphine analogues    SOCIAL:  reports that he has never smoked. He has never used smokeless tobacco. He reports that he does not drink alcohol and does not use drugs.    Objective   Right Abdomen (side) - Upper  Pink pearly papule          Left Eyebrow, Left Forehead, Left Temple, Right Forehead, Right Malar Cheek, Right Thigh - Anterior  Multiple tan to light brown, round to oval shaped, symmetric and uniform appearing macules and small patches consistent with lentigines scattered in sun-exposed areas.     Head - Anterior (Face)  Scattered close comedones        Assessment/Plan   1. Fibrous papule of skin  Right Abdomen (side) - Upper    Fibrous papule versus BCC.  Treatment for diagnostic purposes.  Discussed wound care including applying topical Vaseline or Aquaphor once daily for 7 days.    Shave removal - Right Abdomen (side) - Upper    Specimen 1 - Dermatopathology- DERM LAB  Differential Diagnosis: fibrous papule vs bcc   Check Margins Yes/No?:    Comments:    Dermpath Lab: Routine Histopathology (formalin-fixed tissue)    Related Medications  lidocaine-epinephrine (Xylocaine W/EPI) 1 %-1:100,000 injection 5 mL      2. Lentigo (6)  Right Thigh - Anterior; Left Eyebrow; Left Temple; Right Malar Cheek; Left Forehead; Right Forehead    Discussed nature of condition. Reassurance provided. Recommended continued observation. Treatment options reviewed.    Patient opts for cryotherapy today. Discussed the risks such as permanent scarring, light or dark discoloration, infection, pain, bleeding, bruising, redness, blister formation, and recurrence of the lesion, and the benefits of the procedure, as well as the alternatives. Patient verbalizes understanding.      3. Excessive and redundant skin and subcutaneous tissue    Patient has recently lost a lot of weight and has excessive skin on bilateral hip regions.  He is curious about what can be done to remove the skin.  I recommended a consultation with plastic surgery.  Referral was placed in office today.    Related Procedures  Referral to Plastic Surgery    4. Acne vulgaris  Head - Anterior (Face)    I recommended treatment with 0.05% cream. Discussed retinoid treatment with patient.  I recommended the patient's start by using this medication twice a week for 2 weeks and then use for 3 times a week Monday, Wednesday, and Friday for a month.  If patient does not experience any side effects they can increase the usage to nightly as tolerated.      Topical retinoids can make your face more photosensitive meaning it can burn more easily. It is important to wear SPF 60+ daily while using this medication that is broad spectrum and water resistant.    Discussed sandwiching method and recommended the patient wash her face first, apply face lotion, apply the prescribed topical, and then apply face lotion on top again.      Discussed common side effects such as itching, burning, and dryness.  Discussed with patient that it may take 4-6 months to see an improvement in skin, however, to please continue compliance with the medication and it should help.  Patient verbalizes understanding and opts for treatment today.     Related Medications  tretinoin (Retin-A) 0.05 % cream  Apply topically to clean dry face on MWF. Increase frequency as tolerated.         FOLLOW UP: As needed    The patient was encouraged to contact me with any further questions or concerns.  Akiko Briggs PA-C  7/28/2024

## 2024-07-23 RX ORDER — TRETINOIN 0.5 MG/G
CREAM TOPICAL
Qty: 45 G | Refills: 6 | Status: SHIPPED | OUTPATIENT
Start: 2024-07-23

## 2024-07-24 LAB
LABORATORY COMMENT REPORT: NORMAL
PATH REPORT.FINAL DX SPEC: NORMAL
PATH REPORT.GROSS SPEC: NORMAL
PATH REPORT.MICROSCOPIC SPEC OTHER STN: NORMAL
PATH REPORT.RELEVANT HX SPEC: NORMAL
PATH REPORT.TOTAL CANCER: NORMAL

## 2024-07-31 ENCOUNTER — APPOINTMENT (OUTPATIENT)
Dept: BEHAVIORAL HEALTH | Facility: CLINIC | Age: 59
End: 2024-07-31
Payer: COMMERCIAL

## 2024-07-31 ENCOUNTER — TELEMEDICINE (OUTPATIENT)
Dept: BEHAVIORAL HEALTH | Facility: CLINIC | Age: 59
End: 2024-07-31
Payer: COMMERCIAL

## 2024-07-31 DIAGNOSIS — F33.1 MODERATE EPISODE OF RECURRENT MAJOR DEPRESSIVE DISORDER (MULTI): ICD-10-CM

## 2024-07-31 DIAGNOSIS — F41.1 GENERALIZED ANXIETY DISORDER: ICD-10-CM

## 2024-07-31 PROCEDURE — 99205 OFFICE O/P NEW HI 60 MIN: CPT

## 2024-07-31 RX ORDER — HYDROXYZINE PAMOATE 25 MG/1
25 CAPSULE ORAL 3 TIMES DAILY PRN
Qty: 90 CAPSULE | Refills: 1 | Status: SHIPPED | OUTPATIENT
Start: 2024-07-31 | End: 2024-09-29

## 2024-07-31 RX ORDER — SERTRALINE HYDROCHLORIDE 50 MG/1
50 TABLET, FILM COATED ORAL DAILY
Qty: 30 TABLET | Refills: 1 | Status: SHIPPED | OUTPATIENT
Start: 2024-07-31 | End: 2024-09-29

## 2024-07-31 ASSESSMENT — ANXIETY QUESTIONNAIRES
2. NOT BEING ABLE TO STOP OR CONTROL WORRYING: NEARLY EVERY DAY
IF YOU CHECKED OFF ANY PROBLEMS ON THIS QUESTIONNAIRE, HOW DIFFICULT HAVE THESE PROBLEMS MADE IT FOR YOU TO DO YOUR WORK, TAKE CARE OF THINGS AT HOME, OR GET ALONG WITH OTHER PEOPLE: EXTREMELY DIFFICULT
7. FEELING AFRAID AS IF SOMETHING AWFUL MIGHT HAPPEN: NEARLY EVERY DAY
3. WORRYING TOO MUCH ABOUT DIFFERENT THINGS: NEARLY EVERY DAY
GAD7 TOTAL SCORE: 21
5. BEING SO RESTLESS THAT IT IS HARD TO SIT STILL: NEARLY EVERY DAY
6. BECOMING EASILY ANNOYED OR IRRITABLE: NEARLY EVERY DAY
4. TROUBLE RELAXING: NEARLY EVERY DAY
1. FEELING NERVOUS, ANXIOUS, OR ON EDGE: NEARLY EVERY DAY

## 2024-07-31 ASSESSMENT — PATIENT HEALTH QUESTIONNAIRE - PHQ9
7. TROUBLE CONCENTRATING ON THINGS, SUCH AS READING THE NEWSPAPER OR WATCHING TELEVISION: MORE THAN HALF THE DAYS
4. FEELING TIRED OR HAVING LITTLE ENERGY: MORE THAN HALF THE DAYS
3. TROUBLE FALLING OR STAYING ASLEEP OR SLEEPING TOO MUCH: SEVERAL DAYS
8. MOVING OR SPEAKING SO SLOWLY THAT OTHER PEOPLE COULD HAVE NOTICED. OR THE OPPOSITE, BEING SO FIGETY OR RESTLESS THAT YOU HAVE BEEN MOVING AROUND A LOT MORE THAN USUAL: NOT AT ALL
2. FEELING DOWN, DEPRESSED OR HOPELESS: NEARLY EVERY DAY
1. LITTLE INTEREST OR PLEASURE IN DOING THINGS: MORE THAN HALF THE DAYS
6. FEELING BAD ABOUT YOURSELF - OR THAT YOU ARE A FAILURE OR HAVE LET YOURSELF OR YOUR FAMILY DOWN: MORE THAN HALF THE DAYS
9. THOUGHTS THAT YOU WOULD BE BETTER OFF DEAD, OR OF HURTING YOURSELF: NOT AT ALL
5. POOR APPETITE OR OVEREATING: MORE THAN HALF THE DAYS
10. IF YOU CHECKED OFF ANY PROBLEMS, HOW DIFFICULT HAVE THESE PROBLEMS MADE IT FOR YOU TO DO YOUR WORK, TAKE CARE OF THINGS AT HOME, OR GET ALONG WITH OTHER PEOPLE: VERY DIFFICULT
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
## 2024-08-05 DIAGNOSIS — Z00.00 HEALTH CARE MAINTENANCE: ICD-10-CM

## 2024-08-05 RX ORDER — VALACYCLOVIR HYDROCHLORIDE 1 G/1
TABLET, FILM COATED ORAL
Qty: 20 TABLET | Refills: 0 | Status: SHIPPED | OUTPATIENT
Start: 2024-08-05

## 2024-08-12 PROBLEM — N52.9 ERECTILE DYSFUNCTION: Status: ACTIVE | Noted: 2024-08-12

## 2024-08-12 PROBLEM — F41.9 ANXIETY: Status: RESOLVED | Noted: 2023-10-03 | Resolved: 2024-08-12

## 2024-08-12 PROBLEM — F32.A DEPRESSION: Status: RESOLVED | Noted: 2023-10-03 | Resolved: 2024-08-12

## 2024-08-12 PROBLEM — G44.209 TENSION TYPE HEADACHE: Status: ACTIVE | Noted: 2024-08-12

## 2024-08-12 PROBLEM — M79.10 MUSCLE PAIN: Status: ACTIVE | Noted: 2024-08-12

## 2024-08-12 PROBLEM — M25.561 KNEE PAIN, BILATERAL: Status: ACTIVE | Noted: 2024-08-12

## 2024-08-12 PROBLEM — Z30.09 ENCOUNTER FOR GENERAL COUNSELING AND ADVICE ON CONTRACEPTIVE MANAGEMENT: Status: ACTIVE | Noted: 2024-08-12

## 2024-08-12 PROBLEM — H69.90 DYSFUNCTION OF EUSTACHIAN TUBE: Status: ACTIVE | Noted: 2024-08-12

## 2024-08-12 PROBLEM — R73.02 IMPAIRED GLUCOSE TOLERANCE: Status: ACTIVE | Noted: 2024-08-12

## 2024-08-12 PROBLEM — J30.9 ALLERGIC RHINITIS: Status: ACTIVE | Noted: 2024-08-12

## 2024-08-12 PROBLEM — M87.00 AVASCULAR NECROSIS OF BONE (MULTI): Status: ACTIVE | Noted: 2024-08-12

## 2024-08-12 PROBLEM — B00.9 HERPES SIMPLEX VIRUS (HSV) INFECTION: Status: ACTIVE | Noted: 2024-08-12

## 2024-08-12 PROBLEM — M25.562 KNEE PAIN, BILATERAL: Status: ACTIVE | Noted: 2024-08-12

## 2024-08-12 PROBLEM — M65.939 TENOSYNOVITIS OF WRIST: Status: ACTIVE | Noted: 2024-08-12

## 2024-08-12 PROBLEM — M19.91 PRIMARY LOCALIZED OSTEOARTHRITIS: Status: ACTIVE | Noted: 2024-08-12

## 2024-08-12 PROBLEM — F32.A DEPRESSIVE DISORDER: Status: ACTIVE | Noted: 2024-08-12

## 2024-08-12 PROBLEM — M11.20 CHONDROCALCINOSIS DUE TO DICALCIUM PHOSPHATE CRYSTALS: Status: ACTIVE | Noted: 2024-08-12

## 2024-08-12 PROBLEM — M65.9 TENOSYNOVITIS OF WRIST: Status: ACTIVE | Noted: 2024-08-12

## 2024-08-15 ENCOUNTER — APPOINTMENT (OUTPATIENT)
Dept: BEHAVIORAL HEALTH | Facility: CLINIC | Age: 59
End: 2024-08-15
Payer: COMMERCIAL

## 2024-08-15 DIAGNOSIS — I10 BENIGN ESSENTIAL HYPERTENSION: Primary | ICD-10-CM

## 2024-08-15 DIAGNOSIS — F41.1 GENERALIZED ANXIETY DISORDER: ICD-10-CM

## 2024-08-15 DIAGNOSIS — F33.1 MODERATE EPISODE OF RECURRENT MAJOR DEPRESSIVE DISORDER (MULTI): ICD-10-CM

## 2024-08-15 PROCEDURE — 99214 OFFICE O/P EST MOD 30 MIN: CPT

## 2024-08-15 RX ORDER — HYDROXYZINE HYDROCHLORIDE 10 MG/1
10 TABLET, FILM COATED ORAL EVERY 8 HOURS PRN
Qty: 30 TABLET | Refills: 0 | Status: SHIPPED | OUTPATIENT
Start: 2024-08-15 | End: 2024-08-25

## 2024-08-15 RX ORDER — MINOXIDIL 2.5 MG/1
2.5 TABLET ORAL DAILY
Qty: 30 TABLET | Refills: 0 | Status: SHIPPED | OUTPATIENT
Start: 2024-08-15 | End: 2024-08-18 | Stop reason: SDUPTHER
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
## 2024-08-18 DIAGNOSIS — I10 BENIGN ESSENTIAL HYPERTENSION: ICD-10-CM

## 2024-08-18 RX ORDER — MINOXIDIL 2.5 MG/1
2.5 TABLET ORAL DAILY
Qty: 30 TABLET | Refills: 1 | Status: SHIPPED | OUTPATIENT
Start: 2024-08-18

## 2024-08-26 RX ORDER — HYDROXYZINE HYDROCHLORIDE 25 MG/1
TABLET, FILM COATED ORAL
COMMUNITY
Start: 2023-06-09

## 2024-08-28 ENCOUNTER — APPOINTMENT (OUTPATIENT)
Dept: BEHAVIORAL HEALTH | Facility: CLINIC | Age: 59
End: 2024-08-28
Payer: COMMERCIAL

## 2024-08-28 DIAGNOSIS — F41.8 DEPRESSION WITH ANXIETY: ICD-10-CM

## 2024-08-28 PROCEDURE — 99214 OFFICE O/P EST MOD 30 MIN: CPT

## 2024-08-28 RX ORDER — MIRTAZAPINE 7.5 MG/1
7.5 TABLET, FILM COATED ORAL NIGHTLY
Qty: 30 TABLET | Refills: 1 | Status: SHIPPED | OUTPATIENT
Start: 2024-08-28 | End: 2024-10-27
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
## 2024-08-29 ENCOUNTER — TELEMEDICINE (OUTPATIENT)
Dept: PRIMARY CARE | Facility: CLINIC | Age: 59
End: 2024-08-29
Payer: COMMERCIAL

## 2024-08-29 DIAGNOSIS — U07.1 COVID-19 VIRUS INFECTION: Primary | ICD-10-CM

## 2024-08-29 DIAGNOSIS — J01.90 ACUTE SINUSITIS, RECURRENCE NOT SPECIFIED, UNSPECIFIED LOCATION: ICD-10-CM

## 2024-08-29 DIAGNOSIS — R53.83 OTHER FATIGUE: ICD-10-CM

## 2024-08-29 PROCEDURE — 99442 PR PHYS/QHP TELEPHONE EVALUATION 11-20 MIN: CPT | Performed by: INTERNAL MEDICINE

## 2024-08-29 NOTE — PROGRESS NOTES
Subjective   Patient ID: Raciel Sanchez is a 58 y.o. male who presents for No chief complaint on file..    HPI patient initiated telehealth visit this visit was completed via telephone secondary to the restrictions of the COVID-19 pandemic this visit was actually for COVID-patient was at home and the physician was in the office all medical issues were addressed and discussed with patient patient was not otherwise examined if it was felt that the patient needed to be seen in the office stable to been referred to do so patient verbally consented to visit  Sick visit had tested positive for COVID today as son had tested positive earlier and is on treatment he would like to be on treatment other family members have not been reported positive no, and no other source no chest pain no shortness of breath muscle aches headache and nasal drainage discussed with current medications no bowel issues    Review of Systems    Objective   There were no vitals taken for this visit.    Physical Exam alert and oriented x 3 breathing unlabored not otherwise examined    Assessment/Plan    impression COVID-positive sinus fatigue  Plan okay for Paxlovid therapy 3 tablets in the morning 3 tablets in the evening x 5 days risk-benefit side effects reviewed with him and wishes to proceed see EMR he is no longer taking tamsulosin good diet regular exercise good water consumption Tylenol as needed for low-grade fever Zyrtec 10 mg as needed for sinus throat lozenges as needed 5 days of masking after resolution of symptoms discussed with his family testing positive recheck if no better and for regular visit [id number]''

## 2024-09-07 NOTE — PROGRESS NOTES
Jairo Burton MD  Physician  Primary Care     Progress Notes      Signed     Creation Time: 8/29/2024  3:42 PM     Signed       Expand All Collapse All       Subjective  Patient ID: Raciel Sanchez is a 58 y.o. male who presents for No chief complaint on file..     HPI patient initiated telehealth visit this visit was completed via telephone secondary to the restrictions of the COVID-19 pandemic this visit was actually for COVID-patient was at home and the physician was in the office all medical issues were addressed and discussed with patient patient was not otherwise examined if it was felt that the patient needed to be seen in the office stable to been referred to do so patient verbally consented to visit  Sick visit had tested positive for COVID today as son had tested positive earlier and is on treatment he would like to be on treatment other family members have not been reported positive no, and no other source no chest pain no shortness of breath muscle aches headache and nasal drainage discussed with current medications no bowel issues     Review of Systems           Objective  There were no vitals taken for this visit.     Physical Exam alert and oriented x 3 breathing unlabored not otherwise examined           Assessment/Plan  impression COVID-positive sinus fatigue  Plan okay for Paxlovid therapy 3 tablets in the morning 3 tablets in the evening x 5 days risk-benefit side effects reviewed with him and wishes to proceed see EMR he is no longer taking tamsulosin good diet regular exercise good water consumption Tylenol as needed for low-grade fever Zyrtec 10 mg as needed for sinus throat lozenges as needed 5 days of masking after resolution of symptoms discussed with his family testing positive recheck if no better and for regular visit [id number]''                     Electronically signed by Jairo Burton MD at 9/6/2024  1:14 AM         Orders Only on 8/29/2024          Note shared with  patient  Additional Documentation    Encounter Info: Billing Info,     History,     Allergies     Orders Placed    None  Medication Changes      nirmatrelvir/ritonavir 300 mg (150 mg x 2)-100 mgi 3 tablets oral 2 times daily, Follow the instructions on the package  Medication List  Visit Diagnoses      COVID-19 virus infection  Problem List

## 2024-09-11 ENCOUNTER — TELEPHONE (OUTPATIENT)
Dept: PRIMARY CARE | Facility: CLINIC | Age: 59
End: 2024-09-11

## 2024-09-11 DIAGNOSIS — Z00.00 HEALTH CARE MAINTENANCE: Primary | ICD-10-CM

## 2024-09-12 ENCOUNTER — APPOINTMENT (OUTPATIENT)
Dept: BEHAVIORAL HEALTH | Facility: CLINIC | Age: 59
End: 2024-09-12
Payer: COMMERCIAL

## 2024-09-12 ENCOUNTER — LAB (OUTPATIENT)
Dept: LAB | Facility: LAB | Age: 59
End: 2024-09-12
Payer: COMMERCIAL

## 2024-09-12 DIAGNOSIS — Z00.00 HEALTH CARE MAINTENANCE: ICD-10-CM

## 2024-09-12 PROCEDURE — 87389 HIV-1 AG W/HIV-1&-2 AB AG IA: CPT

## 2024-09-12 PROCEDURE — 87491 CHLMYD TRACH DNA AMP PROBE: CPT

## 2024-09-12 PROCEDURE — 86780 TREPONEMA PALLIDUM: CPT

## 2024-09-12 PROCEDURE — 87591 N.GONORRHOEAE DNA AMP PROB: CPT

## 2024-09-12 PROCEDURE — 36415 COLL VENOUS BLD VENIPUNCTURE: CPT

## 2024-09-13 LAB
C TRACH RRNA SPEC QL NAA+PROBE: NEGATIVE
N GONORRHOEA DNA SPEC QL PROBE+SIG AMP: NEGATIVE

## 2024-10-04 DIAGNOSIS — N52.9 ERECTILE DYSFUNCTION, UNSPECIFIED ERECTILE DYSFUNCTION TYPE: ICD-10-CM

## 2024-10-14 RX ORDER — TADALAFIL 5 MG/1
5 TABLET ORAL DAILY
Qty: 90 TABLET | Refills: 3 | Status: SHIPPED | OUTPATIENT
Start: 2024-10-14

## 2024-10-21 ENCOUNTER — APPOINTMENT (OUTPATIENT)
Dept: PLASTIC SURGERY | Facility: CLINIC | Age: 59
End: 2024-10-21
Payer: COMMERCIAL

## 2024-10-21 VITALS
WEIGHT: 198.8 LBS | DIASTOLIC BLOOD PRESSURE: 86 MMHG | BODY MASS INDEX: 26.93 KG/M2 | SYSTOLIC BLOOD PRESSURE: 151 MMHG | HEART RATE: 76 BPM | HEIGHT: 72 IN

## 2024-10-21 DIAGNOSIS — Z41.1 ENCOUNTER FOR COSMETIC PROCEDURE: Primary | ICD-10-CM

## 2024-10-21 DIAGNOSIS — L98.7 EXCESSIVE AND REDUNDANT SKIN AND SUBCUTANEOUS TISSUE: ICD-10-CM

## 2024-10-21 PROCEDURE — 3008F BODY MASS INDEX DOCD: CPT | Performed by: PHYSICIAN ASSISTANT

## 2024-10-21 PROCEDURE — 99203 OFFICE O/P NEW LOW 30 MIN: CPT | Performed by: PHYSICIAN ASSISTANT

## 2024-10-21 NOTE — PROGRESS NOTES
Department of Plastic and Reconstructive Surgery           Initial Office Consultation    Date: 10/21/24  Referring Provider: Akiko Briggs PA-C  Primary Care Provider: Jairo Burton MD    Subjective   Raciel Sanchez is a 58 y.o. male who was referred by Akiko Briggs PA-C  for evaluation of excess skin and tissue of the abdomen.    He present today to discuss excess skin and tissue of the abdomen. He endorses over the last 3 years he has lost 40lbs however about 1 month ago he gained by 10lbs. He is unhappy with the excess skin and tissue of his abdomen. He denied rashes and sores. He is most interested in decreasing love handles and the area of the central stomach with increased tissue and skin. He endorses when he lost weight he has sagging of the abdominal skin however with weight gain this has gone away.       PMH: anxiety, depression,   Surgical Hx: cervical spine fusion 2022  Family Hx: grandparent pancreatic ca  Smoking: non-smoker, non-nicotine user      Review of Systems   All other systems have been reviewed with the patient and have been found to be negative with exception to the chief complaint as mentioned in the history of present illness.    ROS: As noted in history of present illness    - CONSTITUTIONAL: Denies weight loss, fever and chills.  - HEENT: Denies changes in vision and hearing.  - RESPIRATORY: Denies SOB and cough, difficulty breathing  - CV: Denies palpitations and CP  - GI: Denies abdominal pain, nausea, vomiting and diarrhea.  - : Denies dysuria and urinary frequency.  - MSK: Denies myalgia and joint pain.  - SKIN: Denies rash and pruritus.  - NEUROLOGICAL: Denies headache and syncope.      Objective   Vital Signs:   Vitals:    10/21/24 1040   BP: 151/86   Pulse: 76         Gen: interactive and pleasant  Head: NCAT  Eyes: EOMI, PERRLA  Mouth: MMM  Throat: trachea midline  Cor: RRR  Pulm: nonlabored breathing  Neuro: AAOx3  Ext: extremities perfused    Focused exam of  the: abdomen   Abdomen is soft, non-tender, non-distended. There is excess skin and subcutaneous tissue of the central and lower abdomen around the umbilicus and the b/l flanks. There is no overhang. No pannus. There is minimal laxity of the skin. No hernia appreciated on exam.     Assessment/Plan     Raciel Sanchez is a 58 y.o. male who was referred by Akiko Briggs PA-C for evaluation of excess skin and tissue of the abdomen.    He presents to discuss excess skin and tissue of the lower abdomen following weight loss. Over the last 3 years he lost 40lbs. He  notes he gained approx 10lbs back 2 months ago and his weight is now stable. On exam there is increased subcutaneous tissue of the central and lower abdomen surrounding the umbilicus and b/l flanks. There is no pannus or over hanging skin. There is minimal laxity of the skin in the horizontal or vertical dimensions. We discussed liposuction of the abdomen and flanks to decrease the subcutaneous adiposity of the central abdomen and flanks. Office will provide patient a quote for this.      Plan:   Cosmetic quote for liposuction abdomen    I spent 30 minutes with this patient. Greater than 50% of this time was spent in the counselling and/or coordination of care of this patient.  This note was created using voice recognition software and was not corrected for typographical or grammatical errors.    Signature: Lor Freeman PA-C

## 2024-11-03 DIAGNOSIS — N52.9 ERECTILE DYSFUNCTION, UNSPECIFIED ERECTILE DYSFUNCTION TYPE: ICD-10-CM

## 2024-11-03 DIAGNOSIS — F41.8 DEPRESSION WITH ANXIETY: ICD-10-CM

## 2024-11-04 RX ORDER — MIRTAZAPINE 7.5 MG/1
7.5 TABLET, FILM COATED ORAL NIGHTLY
Qty: 90 TABLET | Refills: 1 | Status: SHIPPED | OUTPATIENT
Start: 2024-11-04

## 2024-11-05 RX ORDER — TADALAFIL 5 MG/1
5 TABLET ORAL DAILY
Qty: 30 TABLET | Refills: 2 | Status: SHIPPED | OUTPATIENT
Start: 2024-11-05

## 2024-12-06 DIAGNOSIS — Z00.00 HEALTH CARE MAINTENANCE: ICD-10-CM

## 2024-12-06 DIAGNOSIS — I10 BENIGN ESSENTIAL HYPERTENSION: ICD-10-CM

## 2024-12-07 RX ORDER — SILDENAFIL 50 MG/1
TABLET, FILM COATED ORAL
Qty: 30 TABLET | Refills: 0 | Status: SHIPPED | OUTPATIENT
Start: 2024-12-07

## 2024-12-07 RX ORDER — MINOXIDIL 2.5 MG/1
2.5 TABLET ORAL DAILY
Qty: 30 TABLET | Refills: 1 | Status: SHIPPED | OUTPATIENT
Start: 2024-12-07

## 2025-01-05 DIAGNOSIS — N52.9 ERECTILE DYSFUNCTION, UNSPECIFIED ERECTILE DYSFUNCTION TYPE: ICD-10-CM

## 2025-01-05 DIAGNOSIS — I10 BENIGN ESSENTIAL HYPERTENSION: ICD-10-CM

## 2025-01-06 RX ORDER — TADALAFIL 5 MG/1
5 TABLET ORAL DAILY
Qty: 90 TABLET | Refills: 3 | Status: SHIPPED
Start: 2025-01-06 | End: 2025-01-07 | Stop reason: SDUPTHER

## 2025-01-07 DIAGNOSIS — Z00.00 HEALTH CARE MAINTENANCE: ICD-10-CM

## 2025-01-09 RX ORDER — MINOXIDIL 2.5 MG/1
2.5 TABLET ORAL DAILY
Qty: 30 TABLET | Refills: 0 | Status: SHIPPED | OUTPATIENT
Start: 2025-01-09 | End: 2025-01-11

## 2025-01-09 RX ORDER — VALACYCLOVIR HYDROCHLORIDE 1 G/1
TABLET, FILM COATED ORAL
Qty: 20 TABLET | Refills: 0 | Status: SHIPPED | OUTPATIENT
Start: 2025-01-09

## 2025-04-03 DIAGNOSIS — I10 BENIGN ESSENTIAL HYPERTENSION: ICD-10-CM

## 2025-04-05 RX ORDER — MINOXIDIL 2.5 MG/1
2.5 TABLET ORAL DAILY
Qty: 30 TABLET | Refills: 0 | Status: SHIPPED | OUTPATIENT
Start: 2025-04-05

## 2025-05-05 DIAGNOSIS — I10 BENIGN ESSENTIAL HYPERTENSION: ICD-10-CM

## 2025-05-05 DIAGNOSIS — F41.8 DEPRESSION WITH ANXIETY: ICD-10-CM

## 2025-05-05 RX ORDER — MIRTAZAPINE 7.5 MG/1
7.5 TABLET, FILM COATED ORAL NIGHTLY
Qty: 90 TABLET | Refills: 1 | OUTPATIENT
Start: 2025-05-05

## 2025-05-10 RX ORDER — MINOXIDIL 2.5 MG/1
2.5 TABLET ORAL DAILY
Qty: 30 TABLET | Refills: 0 | Status: SHIPPED | OUTPATIENT
Start: 2025-05-10

## 2025-05-13 DIAGNOSIS — Z00.00 HEALTH CARE MAINTENANCE: ICD-10-CM

## 2025-05-17 RX ORDER — VALACYCLOVIR HYDROCHLORIDE 1 G/1
1000 TABLET, FILM COATED ORAL EVERY 12 HOURS
Qty: 20 TABLET | Refills: 0 | Status: SHIPPED | OUTPATIENT
Start: 2025-05-17

## 2025-05-18 DIAGNOSIS — F41.8 DEPRESSION WITH ANXIETY: ICD-10-CM

## 2025-05-19 RX ORDER — MIRTAZAPINE 7.5 MG/1
7.5 TABLET, FILM COATED ORAL NIGHTLY
Qty: 90 TABLET | Refills: 1 | OUTPATIENT
Start: 2025-05-19

## 2025-06-18 DIAGNOSIS — I10 BENIGN ESSENTIAL HYPERTENSION: ICD-10-CM

## 2025-06-19 RX ORDER — MINOXIDIL 2.5 MG/1
2.5 TABLET ORAL DAILY
Qty: 30 TABLET | Refills: 0 | Status: SHIPPED | OUTPATIENT
Start: 2025-06-19

## 2025-07-22 DIAGNOSIS — I10 BENIGN ESSENTIAL HYPERTENSION: ICD-10-CM

## 2025-07-26 RX ORDER — MINOXIDIL 2.5 MG/1
2.5 TABLET ORAL DAILY
Qty: 30 TABLET | Refills: 0 | Status: SHIPPED | OUTPATIENT
Start: 2025-07-26

## 2025-08-12 ENCOUNTER — APPOINTMENT (OUTPATIENT)
Dept: PRIMARY CARE | Facility: CLINIC | Age: 60
End: 2025-08-12
Payer: COMMERCIAL

## 2025-08-12 VITALS — SYSTOLIC BLOOD PRESSURE: 116 MMHG | DIASTOLIC BLOOD PRESSURE: 74 MMHG

## 2025-08-12 DIAGNOSIS — B00.9 HERPES SIMPLEX VIRUS (HSV) INFECTION: ICD-10-CM

## 2025-08-12 DIAGNOSIS — N40.1 BENIGN PROSTATIC HYPERPLASIA WITH WEAK URINARY STREAM: ICD-10-CM

## 2025-08-12 DIAGNOSIS — Z00.00 HEALTH CARE MAINTENANCE: Primary | ICD-10-CM

## 2025-08-12 DIAGNOSIS — N52.9 ERECTILE DYSFUNCTION, UNSPECIFIED ERECTILE DYSFUNCTION TYPE: ICD-10-CM

## 2025-08-12 DIAGNOSIS — E29.1 HYPOGONADISM IN MALE: ICD-10-CM

## 2025-08-12 DIAGNOSIS — R39.12 BENIGN PROSTATIC HYPERPLASIA WITH WEAK URINARY STREAM: ICD-10-CM

## 2025-08-12 DIAGNOSIS — L65.9 HAIR LOSS: ICD-10-CM

## 2025-08-12 DIAGNOSIS — I10 BENIGN ESSENTIAL HYPERTENSION: ICD-10-CM

## 2025-08-12 PROCEDURE — 99213 OFFICE O/P EST LOW 20 MIN: CPT | Performed by: INTERNAL MEDICINE

## 2025-08-12 PROCEDURE — 3074F SYST BP LT 130 MM HG: CPT | Performed by: INTERNAL MEDICINE

## 2025-08-12 PROCEDURE — 3078F DIAST BP <80 MM HG: CPT | Performed by: INTERNAL MEDICINE

## 2025-08-24 RX ORDER — MINOXIDIL 2.5 MG/1
2.5 TABLET ORAL DAILY
Qty: 90 TABLET | Refills: 0 | Status: SHIPPED | OUTPATIENT
Start: 2025-08-24

## 2025-08-24 RX ORDER — TADALAFIL 5 MG/1
5 TABLET ORAL DAILY
Qty: 90 TABLET | Refills: 0 | Status: SHIPPED
Start: 2025-08-24 | End: 2025-08-24

## 2025-08-24 RX ORDER — TADALAFIL 5 MG/1
5 TABLET ORAL DAILY
Qty: 90 TABLET | Refills: 0 | Status: SHIPPED | OUTPATIENT
Start: 2025-08-24

## 2025-09-03 LAB
TESTOST FREE SERPL-MCNC: 24.2 PG/ML (ref 35–155)
TESTOST SERPL-MCNC: 314 NG/DL (ref 250–1100)